# Patient Record
Sex: MALE | Race: WHITE | NOT HISPANIC OR LATINO | URBAN - METROPOLITAN AREA
[De-identification: names, ages, dates, MRNs, and addresses within clinical notes are randomized per-mention and may not be internally consistent; named-entity substitution may affect disease eponyms.]

---

## 2017-04-26 ENCOUNTER — ALLSCRIPTS OFFICE VISIT (OUTPATIENT)
Dept: OTHER | Facility: OTHER | Age: 37
End: 2017-04-26

## 2017-04-26 LAB
CLARITY UR: NORMAL
COLOR UR: YELLOW
GLUCOSE (HISTORICAL): NORMAL
HGB UR QL STRIP.AUTO: NORMAL
KETONES UR STRIP-MCNC: NORMAL MG/DL
LEUKOCYTE ESTERASE UR QL STRIP: NORMAL
NITRITE UR QL STRIP: NORMAL
PH UR STRIP.AUTO: 5 [PH]
PROT UR STRIP-MCNC: NORMAL MG/DL
SP GR UR STRIP.AUTO: 1.01

## 2017-05-11 ENCOUNTER — ALLSCRIPTS OFFICE VISIT (OUTPATIENT)
Dept: OTHER | Facility: OTHER | Age: 37
End: 2017-05-11

## 2017-05-11 ENCOUNTER — LAB REQUISITION (OUTPATIENT)
Dept: LAB | Facility: HOSPITAL | Age: 37
End: 2017-05-11
Payer: COMMERCIAL

## 2017-05-11 DIAGNOSIS — Z30.2 ENCOUNTER FOR STERILIZATION: ICD-10-CM

## 2017-05-11 PROCEDURE — 88302 TISSUE EXAM BY PATHOLOGIST: CPT | Performed by: UROLOGY

## 2017-05-25 ENCOUNTER — ALLSCRIPTS OFFICE VISIT (OUTPATIENT)
Dept: OTHER | Facility: OTHER | Age: 37
End: 2017-05-25

## 2017-06-22 DIAGNOSIS — Z30.2 ENCOUNTER FOR STERILIZATION: ICD-10-CM

## 2018-01-13 VITALS
BODY MASS INDEX: 27.9 KG/M2 | HEART RATE: 66 BPM | SYSTOLIC BLOOD PRESSURE: 144 MMHG | HEIGHT: 72 IN | DIASTOLIC BLOOD PRESSURE: 80 MMHG | WEIGHT: 206 LBS

## 2018-01-14 VITALS
HEIGHT: 72 IN | DIASTOLIC BLOOD PRESSURE: 78 MMHG | BODY MASS INDEX: 27.36 KG/M2 | SYSTOLIC BLOOD PRESSURE: 120 MMHG | HEART RATE: 56 BPM | WEIGHT: 202 LBS

## 2018-01-14 VITALS
WEIGHT: 209 LBS | BODY MASS INDEX: 28.31 KG/M2 | HEART RATE: 66 BPM | DIASTOLIC BLOOD PRESSURE: 80 MMHG | SYSTOLIC BLOOD PRESSURE: 140 MMHG | HEIGHT: 72 IN

## 2023-04-29 ENCOUNTER — APPOINTMENT (OUTPATIENT)
Dept: LAB | Facility: CLINIC | Age: 43
End: 2023-04-29

## 2023-04-29 DIAGNOSIS — J45.20 MILD INTERMITTENT ASTHMA WITHOUT COMPLICATION: ICD-10-CM

## 2023-04-29 DIAGNOSIS — Z11.59 NEED FOR HEPATITIS C SCREENING TEST: ICD-10-CM

## 2023-04-29 DIAGNOSIS — Z87.448 HISTORY OF BLOOD IN URINE: ICD-10-CM

## 2023-04-29 DIAGNOSIS — Z11.4 ENCOUNTER FOR SCREENING FOR HIV: ICD-10-CM

## 2023-04-29 DIAGNOSIS — Z87.448 HISTORY OF BLOOD IN URINE: Primary | ICD-10-CM

## 2023-04-29 DIAGNOSIS — E55.9 VITAMIN D INSUFFICIENCY: ICD-10-CM

## 2023-04-29 DIAGNOSIS — R73.01 ELEVATED FASTING GLUCOSE: ICD-10-CM

## 2023-04-29 LAB
25(OH)D3 SERPL-MCNC: 26.5 NG/ML (ref 30–100)
ALBUMIN SERPL BCP-MCNC: 4.1 G/DL (ref 3.5–5)
ALP SERPL-CCNC: 70 U/L (ref 46–116)
ALT SERPL W P-5'-P-CCNC: 35 U/L (ref 12–78)
ANION GAP SERPL CALCULATED.3IONS-SCNC: 0 MMOL/L (ref 4–13)
AST SERPL W P-5'-P-CCNC: 26 U/L (ref 5–45)
BACTERIA UR QL AUTO: ABNORMAL /HPF
BASOPHILS # BLD AUTO: 0.03 THOUSANDS/ΜL (ref 0–0.1)
BASOPHILS NFR BLD AUTO: 1 % (ref 0–1)
BILIRUB SERPL-MCNC: 0.45 MG/DL (ref 0.2–1)
BILIRUB UR QL STRIP: NEGATIVE
BUN SERPL-MCNC: 18 MG/DL (ref 5–25)
CALCIUM SERPL-MCNC: 8.9 MG/DL (ref 8.3–10.1)
CHLORIDE SERPL-SCNC: 107 MMOL/L (ref 96–108)
CHOLEST SERPL-MCNC: 165 MG/DL
CLARITY UR: CLEAR
CO2 SERPL-SCNC: 29 MMOL/L (ref 21–32)
COLOR UR: YELLOW
CREAT SERPL-MCNC: 1.06 MG/DL (ref 0.6–1.3)
EOSINOPHIL # BLD AUTO: 0.17 THOUSAND/ΜL (ref 0–0.61)
EOSINOPHIL NFR BLD AUTO: 4 % (ref 0–6)
ERYTHROCYTE [DISTWIDTH] IN BLOOD BY AUTOMATED COUNT: 12.7 % (ref 11.6–15.1)
GFR SERPL CREATININE-BSD FRML MDRD: 84 ML/MIN/1.73SQ M
GLUCOSE P FAST SERPL-MCNC: 91 MG/DL (ref 65–99)
GLUCOSE UR STRIP-MCNC: NEGATIVE MG/DL
HCT VFR BLD AUTO: 44.9 % (ref 36.5–49.3)
HDLC SERPL-MCNC: 56 MG/DL
HGB BLD-MCNC: 15.5 G/DL (ref 12–17)
HGB UR QL STRIP.AUTO: NEGATIVE
IMM GRANULOCYTES # BLD AUTO: 0.01 THOUSAND/UL (ref 0–0.2)
IMM GRANULOCYTES NFR BLD AUTO: 0 % (ref 0–2)
KETONES UR STRIP-MCNC: NEGATIVE MG/DL
LDLC SERPL CALC-MCNC: 98 MG/DL (ref 0–100)
LEUKOCYTE ESTERASE UR QL STRIP: NEGATIVE
LYMPHOCYTES # BLD AUTO: 1.97 THOUSANDS/ΜL (ref 0.6–4.47)
LYMPHOCYTES NFR BLD AUTO: 42 % (ref 14–44)
MCH RBC QN AUTO: 31.1 PG (ref 26.8–34.3)
MCHC RBC AUTO-ENTMCNC: 34.5 G/DL (ref 31.4–37.4)
MCV RBC AUTO: 90 FL (ref 82–98)
MONOCYTES # BLD AUTO: 0.55 THOUSAND/ΜL (ref 0.17–1.22)
MONOCYTES NFR BLD AUTO: 12 % (ref 4–12)
MUCOUS THREADS UR QL AUTO: ABNORMAL
NEUTROPHILS # BLD AUTO: 1.88 THOUSANDS/ΜL (ref 1.85–7.62)
NEUTS SEG NFR BLD AUTO: 41 % (ref 43–75)
NITRITE UR QL STRIP: NEGATIVE
NON-SQ EPI CELLS URNS QL MICRO: ABNORMAL /HPF
NONHDLC SERPL-MCNC: 109 MG/DL
NRBC BLD AUTO-RTO: 0 /100 WBCS
PH UR STRIP.AUTO: 6.5 [PH]
PLATELET # BLD AUTO: 229 THOUSANDS/UL (ref 149–390)
PMV BLD AUTO: 9.4 FL (ref 8.9–12.7)
POTASSIUM SERPL-SCNC: 4.6 MMOL/L (ref 3.5–5.3)
PROT SERPL-MCNC: 7.4 G/DL (ref 6.4–8.4)
PROT UR STRIP-MCNC: ABNORMAL MG/DL
RBC # BLD AUTO: 4.99 MILLION/UL (ref 3.88–5.62)
RBC #/AREA URNS AUTO: ABNORMAL /HPF
SODIUM SERPL-SCNC: 136 MMOL/L (ref 135–147)
SP GR UR STRIP.AUTO: 1.02 (ref 1–1.03)
TRIGL SERPL-MCNC: 55 MG/DL
TSH SERPL DL<=0.05 MIU/L-ACNC: 0.59 UIU/ML (ref 0.45–4.5)
UROBILINOGEN UR STRIP-ACNC: <2 MG/DL
WBC # BLD AUTO: 4.61 THOUSAND/UL (ref 4.31–10.16)
WBC #/AREA URNS AUTO: ABNORMAL /HPF

## 2023-04-30 LAB
HCV AB SER QL: NORMAL
HIV 1+2 AB+HIV1 P24 AG SERPL QL IA: NORMAL
HIV 2 AB SERPL QL IA: NORMAL
HIV1 AB SERPL QL IA: NORMAL
HIV1 P24 AG SERPL QL IA: NORMAL

## 2023-06-12 ENCOUNTER — OFFICE VISIT (OUTPATIENT)
Dept: FAMILY MEDICINE CLINIC | Facility: CLINIC | Age: 43
End: 2023-06-12
Payer: COMMERCIAL

## 2023-06-12 ENCOUNTER — TELEPHONE (OUTPATIENT)
Dept: FAMILY MEDICINE CLINIC | Facility: CLINIC | Age: 43
End: 2023-06-12

## 2023-06-12 VITALS
TEMPERATURE: 97.9 F | DIASTOLIC BLOOD PRESSURE: 84 MMHG | OXYGEN SATURATION: 98 % | SYSTOLIC BLOOD PRESSURE: 106 MMHG | BODY MASS INDEX: 27.63 KG/M2 | HEART RATE: 52 BPM | HEIGHT: 72 IN | WEIGHT: 204 LBS

## 2023-06-12 DIAGNOSIS — E55.9 VITAMIN D INSUFFICIENCY: ICD-10-CM

## 2023-06-12 DIAGNOSIS — J30.2 SEASONAL ALLERGIES: Primary | ICD-10-CM

## 2023-06-12 DIAGNOSIS — R73.01 ELEVATED FASTING GLUCOSE: ICD-10-CM

## 2023-06-12 DIAGNOSIS — R80.8 OTHER PROTEINURIA: Primary | ICD-10-CM

## 2023-06-12 PROCEDURE — 99214 OFFICE O/P EST MOD 30 MIN: CPT | Performed by: NURSE PRACTITIONER

## 2023-06-12 RX ORDER — OLOPATADINE HYDROCHLORIDE 1 MG/ML
1 SOLUTION/ DROPS OPHTHALMIC 2 TIMES DAILY
Qty: 5 ML | Refills: 1 | Status: SHIPPED | OUTPATIENT
Start: 2023-06-12 | End: 2023-06-12

## 2023-06-12 NOTE — TELEPHONE ENCOUNTER
[facility] called, the eye gel that you ordered is out of stock they have all the regular eye drops or Arithromycin gel

## 2023-06-12 NOTE — PROGRESS NOTES
BMI Counseling: Body mass index is 27 67 kg/m²  The BMI is above normal  Nutrition recommendations include decreasing portion sizes, encouraging healthy choices of fruits and vegetables, decreasing fast food intake, consuming healthier snacks, limiting drinks that contain sugar, moderation in carbohydrate intake, increasing intake of lean protein, reducing intake of saturated and trans fat and reducing intake of cholesterol  Exercise recommendations include vigorous physical activity 75 minutes/week, exercising 3-5 times per week and strength training exercises  No pharmacotherapy was ordered  Rationale for BMI follow-up plan is due to patient being overweight or obese  Depression Screening and Follow-up Plan: Clincally patient does not have depression  No treatment is required  Assessment/Plan:   Pt is a 39 yr old    Presents in office to review labs and follow up on recent complaints   Asthma has been stable   Has a skin tag that needs removing and does need to see Dermatology for multiple skin spots on his back   Does have scars from history of acne   History of microscopic blood and protein  in urine - continues to have protein in urine   I have ordered US of kidney and bladder   States numbness to right hand 4th and 5th digit - was seen by [facility] - showed carpal tunnel but no interventions - will continues to follow up   Lab reviewed   Vitamin D Supplement --> OTC doing well   Follow up in 1 year for annual physical and I will call with US results when I get results        Problem List Items Addressed This Visit    None  Visit Diagnoses     Other proteinuria    -  Primary    Relevant Orders    US kidney and bladder    Comprehensive metabolic panel    CBC and differential    TSH, 3rd generation with Free T4 reflex    Lipid panel    Vitamin D 25 hydroxy    UA w Reflex to Microscopic w Reflex to Culture -Lab Collect    Vitamin D insufficiency        Relevant Orders    Comprehensive metabolic panel    CBC and differential    TSH, 3rd generation with Free T4 reflex    Lipid panel    Vitamin D 25 hydroxy    UA w Reflex to Microscopic w Reflex to Culture -Lab Collect    Elevated fasting glucose        Relevant Orders    Comprehensive metabolic panel    CBC and differential    TSH, 3rd generation with Free T4 reflex    Lipid panel    Vitamin D 25 hydroxy    UA w Reflex to Microscopic w Reflex to Culture -Lab Collect            Subjective:      Patient ID: Lona Charlton is a 39 y o  male  Pt is a 39 yr old    Presents in office to review labs and follow up on recent complaints   Asthma has been stable   Has a skin tag that needs removing and does need to see Dermatology for multiple skin spots on his back  Does have scars from history of acne   History of microscopic blood and protein  in urine - continues to have protein in urine   I have ordered US of kidney and bladder   States numbness to right hand 4th and 5th digit - was seen by [facility] - showed carpal tunnel but no interventions - will continues to follow up   Lab reviewed   Vitamin D Supplement --> OTC doing well   Follow up in 1 year for annual physical and I will call with US results when I get results       The following portions of the patient's history were reviewed and updated as appropriate:   Past Medical History:  He has no past medical history on file ,  _______________________________________________________________________  Medical Problems:  does not have any pertinent problems on file ,  _______________________________________________________________________  Past Surgical History:   has no past surgical history on file ,  _______________________________________________________________________  Family History:  family history is not on file ,  _______________________________________________________________________  Social History:   reports that he has never smoked   He has never used smokeless tobacco  He reports that he does not drink alcohol and does not use drugs  ,  _______________________________________________________________________  Allergies:  has No Known Allergies     _______________________________________________________________________  No current outpatient medications on file  No current facility-administered medications for this visit      _______________________________________________________________________  Review of Systems   Constitutional: Negative for chills, fatigue and unexpected weight change  HENT: Negative for congestion, dental problem, sinus pressure, sore throat and voice change  Eyes: Negative  Respiratory: Negative for cough and shortness of breath  Cardiovascular: Negative for chest pain and palpitations  Gastrointestinal: Negative for abdominal distention, abdominal pain, nausea and vomiting  Endocrine: Negative  History of elevated fasting glucose    Genitourinary: Negative for difficulty urinating and flank pain  History of microscopic blood in urine   Protein in urine will discuss follow up    Musculoskeletal: Negative for arthralgias and joint swelling  Skin: Negative for rash  Allergic/Immunologic: Negative for environmental allergies  Neurological: Negative for headaches  Hematological: Negative for adenopathy  Psychiatric/Behavioral: Negative for sleep disturbance and suicidal ideas  The patient is not nervous/anxious  Objective:  Vitals:    06/12/23 0709   BP: 106/84   BP Location: Right arm   Patient Position: Sitting   Pulse: (!) 52   Temp: 97 9 °F (36 6 °C)   SpO2: 98%   Weight: 92 5 kg (204 lb)   Height: 6' (1 829 m)     Body mass index is 27 67 kg/m²  Physical Exam  Vitals and nursing note reviewed  Constitutional:       Appearance: Normal appearance  Comments: BMI 27 67   HENT:      Head: Atraumatic  Right Ear: Tympanic membrane normal       Left Ear: Tympanic membrane normal       Nose: No congestion or rhinorrhea        Mouth/Throat: Pharynx: No oropharyngeal exudate or posterior oropharyngeal erythema  Eyes:      Extraocular Movements: Extraocular movements intact  Cardiovascular:      Rate and Rhythm: Normal rate  Pulses: Normal pulses  Heart sounds: Normal heart sounds  Pulmonary:      Effort: Pulmonary effort is normal       Breath sounds: Normal breath sounds  Abdominal:      Palpations: Abdomen is soft  Musculoskeletal:         General: Normal range of motion  Cervical back: Normal range of motion and neck supple  Skin:     General: Skin is warm  Comments: Skin tag to right flank area needs to be removed    Neurological:      Mental Status: He is alert and oriented to person, place, and time  Psychiatric:         Mood and Affect: Mood normal          Behavior: Behavior normal         Contains abnormal data CBC and differential  Order: [id number]   Status: Final result      Visible to patient: Yes (seen)      Next appt: 06/14/2024 at 07:00 AM in Family Medicine [facility]su, [address] [place] [address])      Dx: Elevated fasting glucose; History of          4 Result Notes     3 Patient Communications       Component Ref Range & Units 4/29/23  7:37 AM   WBC 4 31 - 10 16 Thousand/uL 4 61    RBC 3 88 - 5 62 Million/uL 4 99    Hemoglobin 12 0 - 17 0 g/dL 15 5    Hematocrit 36 5 - 49 3 % 44 9    MCV 82 - 98 fL 90    MCH 26 8 - 34 3 pg 31 1    MCHC 31 4 - 37 4 g/dL 34 5    RDW 11 6 - 15 1 % 12 7    MPV 8 9 - 12 7 fL 9 4    Platelets 488 - 061 Thousands/uL 229    nRBC /100 WBCs 0    Neutrophils Relative 43 - 75 % 41 Low     Immat GRANS % 0 - 2 % 0    Lymphocytes Relative 14 - 44 % 42    Monocytes Relative 4 - 12 % 12    Eosinophils Relative 0 - 6 % 4    Basophils Relative 0 - 1 % 1    Neutrophils Absolute 1 85 - 7 62 Thousands/µL 1 88    Immature Grans Absolute 0 00 - 0 20 Thousand/uL 0 01    Lymphocytes Absolute 0 60 - 4 47 Thousands/µL 1 97    Monocytes Absolute 0 17 - 1 22 Thousand/µL 0 55    Eosinophils Absolute 0 00 - 0 61 Thousand/µL 0 17    Basophils Absolute 0 00 - 0 10 Thousands/µL 0 03               Specimen Collected: 04/29/23  7:37 AM Last Resulted: 04/29/23  3:46 PM         TSH, 3rd generation with Free T4 reflex  Order: [id number]   Status: Final result   Visible to patient: Yes (seen)   Next appt: 06/14/2024 at 07:00 AM in [facility])   Dx: Elevated fasting glucose; History of       4 Result Notes   3 Patient Communications       Component Ref Range & Units 4/29/23 7:37 AM   TSH 3RD GENERATON 0 450 - 4 500 uIU/mL 0 587    Comment: Adult TSH (3rd generation) reference range follows the recommended guidelines of the American [employer] Association, January, 2020  Contains abnormal data Comprehensive metabolic panel  Order: [id number]   Status: Final result   Visible to patient: Yes (seen)   Next appt: 06/14/2024 at 07:00 AM in [facility])   Dx: Elevated fasting glucose; History of       4 Result Notes   3 Patient Communications       Component Ref Range & Units 4/29/23 7:37 AM   Sodium 135 - 147 mmol/L 136    Potassium 3 5 - 5 3 mmol/L 4 6    Chloride 96 - 108 mmol/L 107    CO2 21 - 32 mmol/L 29    ANION GAP 4 - 13 mmol/L 0 Low    BUN 5 - 25 mg/dL 18    Creatinine 0 60 - 1 30 mg/dL 1 06    Comment: Standardized to IDMS reference method   Glucose, Fasting 65 - 99 mg/dL 91    Comment: Specimen collection should occur prior to Sulfasalazine administration due to the potential for falsely depressed results  Specimen collection should occur prior to Sulfapyridine administration due to the potential for falsely elevated results  Calcium 8 3 - 10 1 mg/dL 8 9    AST 5 - 45 U/L 26    Comment: Specimen collection should occur prior to Sulfasalazine administration due to the potential for falsely depressed results      ALT 12 - 78 U/L 35    Comment: Specimen collection should occur prior to Sulfasalazine and/or Sulfapyridine administration due to the potential for falsely depressed results  Alkaline Phosphatase 46 - 116 U/L 70    Total Protein 6 4 - 8 4 g/dL 7 4    Albumin 3 5 - 5 0 g/dL 4 1    Total Bilirubin 0 20 - 1 00 mg/dL 0 45    Comment: Use of this assay is not recommended for patients undergoing treatment with eltrombopag due to the potential for falsely elevated results  eGFR ml/min/1 73sq m 84         Contains abnormal data Vitamin D 25 hydroxy  Order: [id number]   Status: Final result   Visible to patient: Yes (seen)   Next appt: 06/14/2024 at 07:00 AM in [facility])   Dx: Elevated fasting glucose; History of       4 Result Notes   3 Patient Communications       Component Ref Range & Units 4/29/23 7:37 AM   Vit D, 25-Hydroxy 30 0 - 100 0 ng/mL 26 5 Low    Lipid panel  Order: [id number]   Status: Final result   Visible to patient: Yes (seen)   Next appt: 06/14/2024 at 07:00 AM in [facility])   Dx: Elevated fasting glucose; History of       4 Result Notes   3 Patient Communications       Component Ref Range & Units 4/29/23 7:37 AM   Cholesterol See Comment mg/dL 165    Comment: Cholesterol:     Pediatric <18 Years     Desirable <170 mg/dL   Borderline High 170-199 mg/dL   High >=200 mg/dL     Adult >=18 Years     Desirable <200 mg/dL   Borderline High 200-239 mg/dL   High >239 mg/dL      Triglycerides See Comment mg/dL 55    Comment: Triglyceride:   0-9Y <75mg/dL   10Y-17Y <90 mg/dL     >=18Y   Normal <150 mg/dL   Borderline High 150-199 mg/dL   High 200-499 mg/dL   Very High >499 mg/dL     Specimen collection should occur prior to N-Acetylcysteine or Metamizole administration due to the potential for falsely depressed results  HDL, Direct >=40 mg/dL 56    Comment: Specimen collection should occur prior to Metamizole administration due to the potential for falsley depressed results     LDL Calculated 0 - 100 mg/dL 98    Comment: LDL Cholesterol:   Optimal <100 mg/dl   Near Optimal 100-129 mg/dl   Above Optimal Borderline High 130-159 mg/dl   High 160-189 mg/dl   Very High >189 mg/dl        Hepatitis C antibody  Order: [id number]   Status: Final result   Visible to patient: Yes (seen)   Next appt: 06/14/2024 at 07:00 AM in [facility])   Dx: Need for hepatitis C screening test   2 Result Notes   2 Patient Communications   1  Topic       Component Ref Range & Units 4/29/23 7:37 AM   Hepatitis C Ab Non-Reactive Non-reactive         : HIV 1/2 AB/AG w Reflex [facility] for 2 yr old and above  Order: [id number]   Status: Final result   Visible to patient: Yes (seen)   Next appt: 06/14/2024 at 07:00 AM in [place] [address])   Dx: Encounter for screening for HIV   1 Result Note   1 Patient Communication   1  Topic       Component Ref Range & Units 4/29/23 7:37 AM   HIV-1 p24 Antigen Non-Reactive Non-Reactive    HIV-1 Antibody Non-Reactive Non-Reactive    HIV-2 Antibody Non-Reactive Non-Reactive    HIV Ag-Ab 5th Gen Non-Reactive Non-Reactive    Comment: A Non-Reactive test result does not preclude the possibility of exposure or infection with HIV-1 and/or HIV-2  Non-Reactive results can occur if the quantity of marker present is below the detection limits or is not present during the stage of disease in which a sample is collected  Repeat testing should be considered where there is clinical suspicion of infection  Final Diagnosis  A  Urine, Clean Catch, :  Negative for high grade urothelial carcinoma ([address]) - see comment  Benign urothelial cells  Benign squamous cells  Neutrophils, lymphocytes and red blood cells  Contains abnormal data UA w Reflex to Microscopic w Reflex to Culture -Lab Collect  Order: [id number]   Status: Final result      Visible to patient: Yes (seen)      Next appt: 06/14/2024 at 07:00 AM in [facility]  [address])      Dx: Elevated fasting glucose; History of         Specimen Information: Urine, Clean Catch    4 Result Notes     3 Patient Communications        Component Ref Range & Units 4/29/23  7:37 AM 4/26/17  9:49 AM   Color, UA  Yellow  Yellow    Clarity, UA  Clear  Transparent    Specific [place], UA 1 003 - 1 030 1 023  1 010 R    pH, UA 4 5, 5 0, 5 5, 6 0, 6 5, 7 0, 7 5, 8 0 6 5  5 R    Leukocytes, UA Negative Negative  - R    Nitrite, UA Negative Negative  - R    Protein, UA Negative mg/dl Trace Abnormal   - R    Glucose, UA Negative mg/dl Negative     Ketones, UA Negative mg/dl Negative  - R    Urobilinogen, UA <2 0 mg/dl mg/dl <2 0     Bilirubin, UA Negative Negative     Occult Blood, UA Negative Negative  - R               Specimen Collected: 04/29/23  7:37 AM Last Resulted: 04/29/23  7:23 PM           Contains abnormal data Urine Microscopic  Order: [id number] - Reflex for Order [id number]   Status: Final result   Visible to patient: Yes (seen)   Next appt: 06/14/2024 at 07:00 AM in Family [facility])   Dx: Elevated fasting glucose; History of       4 Result Notes   3 Patient Communications       Component Ref Range & Units 4/29/23 7:37 AM   RBC, UA None Seen, 1-2 /hpf 1-2    WBC, UA None Seen, 1-2 /hpf 1-2    Epithelial Cells None Seen, Occasional /hpf Occasional    Bacteria, UA None Seen, Occasional /hpf None Seen    MUCUS THREADS None Seen Occasional Abnormal              Specimen Collected: 04/29/23 7:37 AM Last Resulted: 04/29/23 7:32 PM

## 2023-07-06 ENCOUNTER — HOSPITAL ENCOUNTER (OUTPATIENT)
Dept: ULTRASOUND IMAGING | Facility: HOSPITAL | Age: 43
Discharge: HOME | End: 2023-07-06
Payer: COMMERCIAL

## 2023-07-06 DIAGNOSIS — R80.8 OTHER PROTEINURIA: ICD-10-CM

## 2023-07-06 PROCEDURE — 76775 US EXAM ABDO BACK WALL LIM: CPT

## 2024-03-26 ENCOUNTER — APPOINTMENT (OUTPATIENT)
Dept: RADIOLOGY | Facility: CLINIC | Age: 44
End: 2024-03-26
Payer: COMMERCIAL

## 2024-03-26 ENCOUNTER — OFFICE VISIT (OUTPATIENT)
Dept: FAMILY MEDICINE CLINIC | Facility: CLINIC | Age: 44
End: 2024-03-26
Payer: COMMERCIAL

## 2024-03-26 ENCOUNTER — LAB (OUTPATIENT)
Dept: LAB | Facility: CLINIC | Age: 44
End: 2024-03-26
Payer: COMMERCIAL

## 2024-03-26 VITALS
SYSTOLIC BLOOD PRESSURE: 108 MMHG | TEMPERATURE: 98 F | WEIGHT: 202 LBS | BODY MASS INDEX: 27.36 KG/M2 | HEIGHT: 72 IN | HEART RATE: 96 BPM | OXYGEN SATURATION: 97 % | DIASTOLIC BLOOD PRESSURE: 70 MMHG

## 2024-03-26 DIAGNOSIS — R80.8 OTHER PROTEINURIA: ICD-10-CM

## 2024-03-26 DIAGNOSIS — E55.9 VITAMIN D INSUFFICIENCY: ICD-10-CM

## 2024-03-26 DIAGNOSIS — Z12.11 SCREENING FOR COLON CANCER: ICD-10-CM

## 2024-03-26 DIAGNOSIS — R73.01 ELEVATED FASTING GLUCOSE: ICD-10-CM

## 2024-03-26 DIAGNOSIS — M25.561 ACUTE PAIN OF RIGHT KNEE: Primary | ICD-10-CM

## 2024-03-26 DIAGNOSIS — M25.561 ACUTE PAIN OF RIGHT KNEE: ICD-10-CM

## 2024-03-26 LAB
25(OH)D3 SERPL-MCNC: 30.5 NG/ML (ref 30–100)
ALBUMIN SERPL BCP-MCNC: 4.6 G/DL (ref 3.5–5)
ALP SERPL-CCNC: 61 U/L (ref 34–104)
ALT SERPL W P-5'-P-CCNC: 26 U/L (ref 7–52)
ANION GAP SERPL CALCULATED.3IONS-SCNC: 9 MMOL/L (ref 4–13)
AST SERPL W P-5'-P-CCNC: 27 U/L (ref 13–39)
BACTERIA UR QL AUTO: ABNORMAL /HPF
BASOPHILS # BLD AUTO: 0.03 THOUSANDS/ÂΜL (ref 0–0.1)
BASOPHILS NFR BLD AUTO: 1 % (ref 0–1)
BILIRUB SERPL-MCNC: 1.16 MG/DL (ref 0.2–1)
BILIRUB UR QL STRIP: NEGATIVE
BUN SERPL-MCNC: 13 MG/DL (ref 5–25)
CALCIUM SERPL-MCNC: 9.6 MG/DL (ref 8.4–10.2)
CHLORIDE SERPL-SCNC: 101 MMOL/L (ref 96–108)
CHOLEST SERPL-MCNC: 164 MG/DL
CLARITY UR: CLEAR
CO2 SERPL-SCNC: 29 MMOL/L (ref 21–32)
COLOR UR: YELLOW
CREAT SERPL-MCNC: 0.95 MG/DL (ref 0.6–1.3)
EOSINOPHIL # BLD AUTO: 0.19 THOUSAND/ÂΜL (ref 0–0.61)
EOSINOPHIL NFR BLD AUTO: 3 % (ref 0–6)
ERYTHROCYTE [DISTWIDTH] IN BLOOD BY AUTOMATED COUNT: 12.6 % (ref 11.6–15.1)
ERYTHROCYTE [SEDIMENTATION RATE] IN BLOOD: 7 MM/HOUR (ref 0–14)
GFR SERPL CREATININE-BSD FRML MDRD: 96 ML/MIN/1.73SQ M
GLUCOSE P FAST SERPL-MCNC: 83 MG/DL (ref 65–99)
GLUCOSE UR STRIP-MCNC: NEGATIVE MG/DL
HCT VFR BLD AUTO: 44.4 % (ref 36.5–49.3)
HDLC SERPL-MCNC: 52 MG/DL
HGB BLD-MCNC: 15.3 G/DL (ref 12–17)
HGB UR QL STRIP.AUTO: NEGATIVE
IMM GRANULOCYTES # BLD AUTO: 0.01 THOUSAND/UL (ref 0–0.2)
IMM GRANULOCYTES NFR BLD AUTO: 0 % (ref 0–2)
KETONES UR STRIP-MCNC: NEGATIVE MG/DL
LDLC SERPL CALC-MCNC: 92 MG/DL (ref 0–100)
LEUKOCYTE ESTERASE UR QL STRIP: NEGATIVE
LYMPHOCYTES # BLD AUTO: 2.49 THOUSANDS/ÂΜL (ref 0.6–4.47)
LYMPHOCYTES NFR BLD AUTO: 44 % (ref 14–44)
MCH RBC QN AUTO: 30.6 PG (ref 26.8–34.3)
MCHC RBC AUTO-ENTMCNC: 34.5 G/DL (ref 31.4–37.4)
MCV RBC AUTO: 89 FL (ref 82–98)
MONOCYTES # BLD AUTO: 0.52 THOUSAND/ÂΜL (ref 0.17–1.22)
MONOCYTES NFR BLD AUTO: 9 % (ref 4–12)
MUCOUS THREADS UR QL AUTO: ABNORMAL
NEUTROPHILS # BLD AUTO: 2.42 THOUSANDS/ÂΜL (ref 1.85–7.62)
NEUTS SEG NFR BLD AUTO: 43 % (ref 43–75)
NITRITE UR QL STRIP: NEGATIVE
NON-SQ EPI CELLS URNS QL MICRO: ABNORMAL /HPF
NONHDLC SERPL-MCNC: 112 MG/DL
NRBC BLD AUTO-RTO: 0 /100 WBCS
PH UR STRIP.AUTO: 6.5 [PH]
PLATELET # BLD AUTO: 234 THOUSANDS/UL (ref 149–390)
PMV BLD AUTO: 9.5 FL (ref 8.9–12.7)
POTASSIUM SERPL-SCNC: 3.9 MMOL/L (ref 3.5–5.3)
PROT SERPL-MCNC: 7.2 G/DL (ref 6.4–8.4)
PROT UR STRIP-MCNC: ABNORMAL MG/DL
RBC # BLD AUTO: 5 MILLION/UL (ref 3.88–5.62)
RBC #/AREA URNS AUTO: ABNORMAL /HPF
SODIUM SERPL-SCNC: 139 MMOL/L (ref 135–147)
SP GR UR STRIP.AUTO: 1.02 (ref 1–1.03)
TRIGL SERPL-MCNC: 99 MG/DL
TSH SERPL DL<=0.05 MIU/L-ACNC: 1.4 UIU/ML (ref 0.45–4.5)
UROBILINOGEN UR STRIP-ACNC: <2 MG/DL
WBC # BLD AUTO: 5.66 THOUSAND/UL (ref 4.31–10.16)
WBC #/AREA URNS AUTO: ABNORMAL /HPF

## 2024-03-26 PROCEDURE — 85025 COMPLETE CBC W/AUTO DIFF WBC: CPT

## 2024-03-26 PROCEDURE — 84443 ASSAY THYROID STIM HORMONE: CPT

## 2024-03-26 PROCEDURE — 80053 COMPREHEN METABOLIC PANEL: CPT

## 2024-03-26 PROCEDURE — 80061 LIPID PANEL: CPT

## 2024-03-26 PROCEDURE — 73564 X-RAY EXAM KNEE 4 OR MORE: CPT

## 2024-03-26 PROCEDURE — 36415 COLL VENOUS BLD VENIPUNCTURE: CPT

## 2024-03-26 PROCEDURE — 99213 OFFICE O/P EST LOW 20 MIN: CPT | Performed by: NURSE PRACTITIONER

## 2024-03-26 PROCEDURE — 81001 URINALYSIS AUTO W/SCOPE: CPT

## 2024-03-26 PROCEDURE — 85652 RBC SED RATE AUTOMATED: CPT | Performed by: NURSE PRACTITIONER

## 2024-03-26 PROCEDURE — 82306 VITAMIN D 25 HYDROXY: CPT

## 2024-03-26 NOTE — RESULT ENCOUNTER NOTE
Ortho referral placed to further discuss   There is mild osteoarthritis of the right knee, with mild medial joint compartment narrowing and marginal spurring, and spurring associated with the patellofemoral joint. There is bony deformity and spurring associated with the proximal tibiofibular   joint, possibly related to remote trauma

## 2024-03-26 NOTE — PROGRESS NOTES
Assessment/Plan:    Pt is a 44 yr old male   Presents in office for right knee pain now for 2 weeks after working on the floor - knee swelling . Did not use knee protector. Has pain with ambulation and does appear swollen   No ROM issues . I have ordered an XR to be done STAT   Will call with results   And discusses follow up with ORTHO. Discussed possibility of knee effusion due to pressure   ICE ELEVATE REST and use immobilizer to knee to prevent further swelling   Can take NSAIDs - with food as discussed   NEGATIVE FOR CALF PAIN   NO ANKLE OR CALF EDEMA      Problem List Items Addressed This Visit    None  Visit Diagnoses       Acute pain of right knee    -  Primary    Relevant Orders    XR knee 4+ vw right injury    Sedimentation rate, automated    Screening for colon cancer        Relevant Orders    Cologuard              Subjective:      Patient ID: Maciej Feliz is a 44 y.o. male.    Pt is a 44 yr old male   Presents in office for right knee pain now for 2 weeks after working on the floor - knee swelling . Did not use knee protector. Has pain with ambulation and does appear swollen   No ROM issues .         The following portions of the patient's history were reviewed and updated as appropriate:   Past Medical History:  He has no past medical history on file.,  _______________________________________________________________________  Medical Problems:  does not have any pertinent problems on file.,  _______________________________________________________________________  Past Surgical History:   has no past surgical history on file.,  _______________________________________________________________________  Family History:  family history is not on file.,  _______________________________________________________________________  Social History:   reports that he has never smoked. He has never used smokeless tobacco. He reports that he does not drink alcohol and does not use  drugs.,  _______________________________________________________________________  Allergies:  has No Known Allergies..  _______________________________________________________________________  No current outpatient medications on file.     No current facility-administered medications for this visit.     _______________________________________________________________________  Review of Systems   Constitutional:  Negative for fatigue, fever and unexpected weight change.   HENT:  Negative for congestion, postnasal drip and sore throat.    Eyes: Negative.    Respiratory:  Negative for cough and shortness of breath.    Cardiovascular:  Negative for chest pain and palpitations.   Gastrointestinal:  Negative for abdominal distention.   Genitourinary:  Negative for difficulty urinating and flank pain.   Musculoskeletal:  Positive for joint swelling (right knee).   Skin:  Negative for rash.   Neurological:  Negative for headaches.   Psychiatric/Behavioral:  Negative for sleep disturbance and suicidal ideas. The patient is not nervous/anxious.          Objective:  Vitals:    03/26/24 0705   BP: 108/70   BP Location: Right arm   Patient Position: Sitting   Cuff Size: Large   Pulse: 96   Temp: 98 °F (36.7 °C)   SpO2: 97%   Weight: 91.6 kg (202 lb)   Height: 6' (1.829 m)     Body mass index is 27.4 kg/m².     Physical Exam  Vitals and nursing note reviewed.   Constitutional:       Appearance: Normal appearance.   HENT:      Head: Normocephalic.      Nose: No congestion or rhinorrhea.   Abdominal:      Palpations: Abdomen is soft.   Musculoskeletal:         General: Swelling and tenderness present.      Right lower leg: No edema.      Left lower leg: No edema.      Comments: Right knee swelling and tenderness to right lateral aspect and swelling bellow knee cap    Neurological:      Mental Status: He is alert and oriented to person, place, and time.   Psychiatric:         Mood and Affect: Mood normal.         Behavior: Behavior  normal.          Statement Selected

## 2024-03-27 DIAGNOSIS — Z00.6 ENCOUNTER FOR EXAMINATION FOR NORMAL COMPARISON OR CONTROL IN CLINICAL RESEARCH PROGRAM: ICD-10-CM

## 2024-04-01 ENCOUNTER — OFFICE VISIT (OUTPATIENT)
Dept: OBGYN CLINIC | Facility: CLINIC | Age: 44
End: 2024-04-01
Payer: COMMERCIAL

## 2024-04-01 VITALS
BODY MASS INDEX: 27.06 KG/M2 | SYSTOLIC BLOOD PRESSURE: 105 MMHG | WEIGHT: 199.8 LBS | DIASTOLIC BLOOD PRESSURE: 66 MMHG | HEIGHT: 72 IN | HEART RATE: 55 BPM

## 2024-04-01 DIAGNOSIS — M25.561 ACUTE PAIN OF RIGHT KNEE: ICD-10-CM

## 2024-04-01 DIAGNOSIS — M22.2X1 PATELLOFEMORAL DISORDER OF RIGHT KNEE: Primary | ICD-10-CM

## 2024-04-01 PROCEDURE — 99203 OFFICE O/P NEW LOW 30 MIN: CPT | Performed by: STUDENT IN AN ORGANIZED HEALTH CARE EDUCATION/TRAINING PROGRAM

## 2024-04-01 NOTE — PROGRESS NOTES
Orthopaedics Office Visit - New Patient Visit    ASSESSMENT/PLAN:    Assessment:   Right knee patellofemoral syndrome    Plan:   X-rays reviewed and discussed with patient revealing mild medial and patellofemoral narrowing. There is no fracture or dislocation  Pt to be weightbearing as tolerated to right lower extremity  ROM as tolerated to right knee  Discussed with patient that if the pain worsens he can come back for a corticosteroid injection and PT script  Pt to begin home exercise program for strengthening of his VMO.  Pt to continue at home analgesic regimen with aleve   Pt to follow up as needed    _____________________________________________________  CHIEF COMPLAINT:  No chief complaint on file.        SUBJECTIVE:  Maciej Feliz is a 44 y.o. male who presents with right knee pain for 2 weeks.  He states he was kneeling while following the floor and he felt a pop in his knee.  He had pain for 2 minutes and then the pain subsided.  Pain was on the medial aspect of his knee.  He states that his knee was swollen for a few days but has subsided since then.  Now he has pain with walking and kneeling.  His pain is mild.  He denies any numbness or tingling to the right lower extremity.  He has not had pain in his knee in the recent past.  He he states that he had a traumatic injury to the knee when he was a teenager but his knee has not been bothering him since the accident.    PAST MEDICAL HISTORY:  No past medical history on file.    PAST SURGICAL HISTORY:  No past surgical history on file.    FAMILY HISTORY:  No family history on file.    SOCIAL HISTORY:  Social History     Tobacco Use    Smoking status: Never    Smokeless tobacco: Never   Vaping Use    Vaping status: Never Used   Substance Use Topics    Alcohol use: Never    Drug use: Never       MEDICATIONS:  No current outpatient medications on file.    ALLERGIES:  No Known Allergies    REVIEW OF SYSTEMS:  MSK: Right knee pain   Neuro: no numbness or  "paresthesias  Pertinent items are otherwise noted in HPI.  A comprehensive review of systems was otherwise negative.    LABS:  HgA1c:   Lab Results   Component Value Date    HGBA1C 5.3 10/18/2019     BMP:   Lab Results   Component Value Date    CALCIUM 9.6 03/26/2024    K 3.9 03/26/2024    CO2 29 03/26/2024     03/26/2024    BUN 13 03/26/2024    CREATININE 0.95 03/26/2024     CBC: No components found for: \"CBC\"    _____________________________________________________  PHYSICAL EXAMINATION:  Vital signs: There were no vitals taken for this visit.  General: No acute distress, awake and alert  Psychiatric: Mood and affect appear appropriate  HEENT: Trachea Midline, No torticollis, no apparent facial trauma  Cardiovascular: No audible murmurs; Extremities appear perfused  Pulmonary: No audible wheezing or stridor  Skin: No open lesions; see further details (if any) below    MUSCULOSKELETAL EXAMINATION:  Extremities:    The right lower extremity was exposed and inspected. Visible skin intact without erythema, ecchymosis, effusion or obvious osseous deformity. TTP medial patellar facet. Pt able to range from 0 degrees of extension to 130 degrees of flexion with crepitation.  Negative patellar grind test, patellar apprehension 1 out of 4, Negative Lachman, negative Ca test. sensation intact to superficial peroneal, deep peroneal, sural, saphenous, plantar nerve distributions. Motor intact to extensor hallux longus, tibialis anterior, gastrocnemius muscles, extensor mechanism intact. Limb is well perfused. Brisk capillary refill in all 5 digits. Compartments soft and compressible.        _____________________________________________________  STUDIES REVIEWED:  I personally reviewed the images and interpretation is as follows:  X-rays of the right knee demonstrate mild medial and patellofemoral compartment narrowing with para-articular osteophyte formation.  The lateral joint line is well-preserved.  There is no " fracture or dislocation noted.  None    PROCEDURES PERFORMED:  Procedures none    Kiel Severino MD

## 2024-04-06 LAB — COLOGUARD RESULT REPORTABLE: NEGATIVE

## 2024-04-24 ENCOUNTER — APPOINTMENT (OUTPATIENT)
Dept: LAB | Facility: AMBULARY SURGERY CENTER | Age: 44
End: 2024-04-24

## 2024-04-24 DIAGNOSIS — Z00.6 ENCOUNTER FOR EXAMINATION FOR NORMAL COMPARISON OR CONTROL IN CLINICAL RESEARCH PROGRAM: ICD-10-CM

## 2024-04-24 PROCEDURE — 36415 COLL VENOUS BLD VENIPUNCTURE: CPT

## 2024-05-12 LAB
APOB+LDLR+PCSK9 GENE MUT ANL BLD/T: NOT DETECTED
BRCA1+BRCA2 DEL+DUP + FULL MUT ANL BLD/T: NOT DETECTED
MLH1+MSH2+MSH6+PMS2 GN DEL+DUP+FUL M: NOT DETECTED

## 2024-06-05 ENCOUNTER — OFFICE VISIT (OUTPATIENT)
Dept: FAMILY MEDICINE CLINIC | Facility: CLINIC | Age: 44
End: 2024-06-05
Payer: COMMERCIAL

## 2024-06-05 VITALS
SYSTOLIC BLOOD PRESSURE: 108 MMHG | WEIGHT: 201 LBS | HEIGHT: 72 IN | BODY MASS INDEX: 27.22 KG/M2 | TEMPERATURE: 97.9 F | DIASTOLIC BLOOD PRESSURE: 74 MMHG | OXYGEN SATURATION: 98 % | HEART RATE: 58 BPM

## 2024-06-05 DIAGNOSIS — E55.9 VITAMIN D DEFICIENCY: ICD-10-CM

## 2024-06-05 DIAGNOSIS — Z00.00 ANNUAL PHYSICAL EXAM: ICD-10-CM

## 2024-06-05 DIAGNOSIS — M25.531 RIGHT WRIST PAIN: ICD-10-CM

## 2024-06-05 DIAGNOSIS — R17 ELEVATED BILIRUBIN: Primary | ICD-10-CM

## 2024-06-05 PROCEDURE — 99396 PREV VISIT EST AGE 40-64: CPT | Performed by: NURSE PRACTITIONER

## 2024-06-05 NOTE — PATIENT INSTRUCTIONS

## 2024-06-05 NOTE — PROGRESS NOTES
Adult Annual Physical  Name: Maciej Feliz      : 1980      MRN: [id number]  Encounter Provider: AZUL Rios  Encounter Date: 2024   Encounter department[facility]    Assessment & Plan   1. Elevated bilirubin  -     Comprehensive metabolic panel; Future  2. Annual physical exam    Immunizations and preventive care screenings were discussed with patient today. Appropriate education was printed on patient's after visit summary.    Discussed risks and benefits of prostate cancer screening. We discussed the controversial history of PSA screening for prostate cancer in the [place] as well as the risk of over detection and over treatment of prostate cancer by way of PSA screening.  The patient understands that PSA blood testing is an imperfect way to screen for prostate cancer and that elevated PSA levels in the blood may also be caused by infection, inflammation, prostatic trauma or manipulation, urological procedures, or by benign prostatic enlargement.    The role of the digital rectal examination in prostate cancer screening was also discussed and I discussed with him that there is large interobserver variability in the findings of digital rectal examination.    Counseling:  Alcohol/drug use: discussed moderation in alcohol intake, the recommendations for healthy alcohol use, and avoidance of illicit drug use.  Dental Health: discussed importance of regular tooth brushing, flossing, and dental visits.  Injury prevention: discussed safety/seat belts, safety helmets, smoke detectors, carbon dioxide detectors, and smoking near bedding or upholstery.  Sexual health: discussed sexually transmitted diseases, partner selection, use of condoms, avoidance of unintended pregnancy, and contraceptive alternatives.  Exercise: the importance of regular exercise/physical activity was discussed. Recommend exercise 3-5 times per week for at least 30 minutes.        Depression Screening and Follow-up Plan: Patient was screened for depression during today's encounter. They screened negative with a PHQ-2 score of 0.        History of Present Illness     Adult Annual Physical:  Patient presents for annual physical. Pt is a 44 yr old male  Presents in office for annual physical   Denies any issues currently .     Diet and Physical Activity:  - Diet/Nutrition: well balanced diet.  - Exercise: moderate cardiovascular exercise.    Depression Screening:  - PHQ-2 Score: 0    General Health:  - Sleep: sleeps poorly and sleeps well.  - Hearing: normal hearing bilateral ears.  - Vision: no vision problems and goes for regular eye exams.  - Dental: regular dental visits.     Health:  - History of STDs: no.   - Urinary symptoms: none.     Advanced Care Planning:  - Has an advanced directive?: no    - Has a durable medical POA?: no    - ACP document given to patient?: no      Review of Systems   Constitutional:  Negative for chills, fatigue, fever and unexpected weight change.   HENT:  Negative for congestion, dental problem, sinus pressure, sore throat and voice change.    Eyes: Negative.    Respiratory:  Negative for cough and shortness of breath.    Cardiovascular:  Negative for chest pain and palpitations.   Gastrointestinal:  Negative for abdominal distention, abdominal pain, nausea and vomiting.   Endocrine: Negative.         History of elevated fasting glucose    Genitourinary:  Negative for difficulty urinating and flank pain.        History of microscopic blood in urine    Musculoskeletal:  Negative for arthralgias and joint swelling.   Skin:  Negative for rash.   Allergic/Immunologic: Negative for environmental allergies.   Neurological:  Negative for headaches.   Hematological:  Negative for adenopathy.   Psychiatric/Behavioral:  Negative for sleep disturbance and suicidal ideas. The patient is not nervous/anxious.      Pertinent Medical History   Reviewed       Medical  History Reviewed by provider this encounter:  Tobacco  Allergies  Meds  Problems  Med Hx  Surg Hx  Fam Hx       Past Medical History   History reviewed. No pertinent past medical history.  History reviewed. No pertinent surgical history.  History reviewed. No pertinent family history.  Current Outpatient Medications on File Prior to Visit   Medication Sig Dispense Refill    Cholecalciferol (Vitamin D3) 125 MCG (5000 UT) TABS Take 5,000 Units by mouth daily       No current facility-administered medications on file prior to visit.   No Known Allergies   Current Outpatient Medications on File Prior to Visit   Medication Sig Dispense Refill    Cholecalciferol (Vitamin D3) 125 MCG (5000 UT) TABS Take 5,000 Units by mouth daily       No current facility-administered medications on file prior to visit.      Social History     Tobacco Use    Smoking status: Never    Smokeless tobacco: Never   Vaping Use    Vaping status: Never Used   Substance and Sexual Activity    Alcohol use: Never    Drug use: Never    Sexual activity: Not on file       Objective     /74 (BP Location: Right arm, Patient Position: Sitting, Cuff Size: Adult)   Pulse 58   Temp 97.9 °F (36.6 °C)   Ht 6' (1.829 m)   Wt 91.2 kg (201 lb)   SpO2 98%   BMI 27.26 kg/m²     Physical Exam  Vitals and nursing note reviewed.   Constitutional:       Appearance: Normal appearance.      Comments: BMI 27.26   HENT:      Head: Atraumatic.   Eyes:      Extraocular Movements: Extraocular movements intact.   Cardiovascular:      Rate and Rhythm: Normal rate and regular rhythm.      Pulses: Normal pulses.      Heart sounds: Normal heart sounds.   Pulmonary:      Effort: Pulmonary effort is normal.      Breath sounds: Normal breath sounds.   Abdominal:      Palpations: Abdomen is soft.   Musculoskeletal:      Cervical back: Normal range of motion.      Right lower leg: No edema.      Left lower leg: No edema.   Skin:     General: Skin is warm.       Capillary Refill: Capillary refill takes less than 2 seconds.   Neurological:      Mental Status: He is alert and oriented to person, place, and time.   Psychiatric:         Mood and Affect: Mood normal.         Behavior: Behavior normal.       Administrative Statements   I have spent a total time of 30  minutes on 06/05/24 In caring for this patient including Diagnostic results, Risks and benefits of tx options, Instructions for management, Patient and family education, Reviewing / ordering tests, medicine, procedures  , and Obtaining or reviewing history  .    Contains abnormal data Comprehensive metabolic panel  Order: [id number]   Status: Final result       Visible to patient: Yes (seen)       Next appt: 06/09/2025 at 07:00 AM in Family Medicine (AZUL Rios)       Dx: Elevated fasting glucose; Vitamin D i...    0 Result Notes        Component  Ref Range & Units 3/26/24  7:42 AM 4/29/23  7:37 AM 10/18/19  7:35 AM   Sodium  135 - 147 mmol/L 139 136 141 R   Potassium  3.5 - 5.3 mmol/L 3.9 4.6 4.3 R   Chloride  96 - 108 mmol/L 101 107 107 R   CO2  21 - 32 mmol/L 29 29 28 R   ANION GAP  4 - 13 mmol/L 9 0 Low  6 R   BUN  5 - 25 mg/dL 13 18 14 R   Creatinine  0.60 - 1.30 mg/dL 0.95 1.06 CM 1.02 R   Comment: Standardized to IDMS reference method   Glucose, Fasting  65 - 99 mg/dL 83 91 CM    Calcium  8.4 - 10.2 mg/dL 9.6 8.9 R 9.1 R   AST  13 - 39 U/L 27 26 R, CM 19 R   ALT  7 - 52 U/L 26 35 R, CM 30 R   Comment: Specimen collection should occur prior to Sulfasalazine administration due to the potential for falsely depressed results.   Alkaline Phosphatase  34 - 104 U/L 61 70 R 78 R   Total Protein  6.4 - 8.4 g/dL 7.2 7.4 7.2 R   Albumin  3.5 - 5.0 g/dL 4.6 4.1 4.2 R   Total Bilirubin  0.20 - 1.00 mg/dL 1.16 High  0.45 CM 0.6 R   Comment: Use of this assay is not recommended for patients undergoing treatment with eltrombopag due to the potential for falsely elevated results.  N-acetyl-p-benzoquinone imine  (metabolite of Acetaminophen) will generate erroneously low results in samples for patients that have taken an overdose of Acetaminophen.   eGFR  ml/min/1.73sq m 96 84 92 R, CM      CBC and differential  Order: [id number]   Status: Final result       Visible to patient: Yes (seen)       Next appt: 06/09/2025 at 07:00 AM in [facility] (AZUL Rios)       Dx: Elevated fasting glucose; Vitamin D i...    0 Result Notes       Component  Ref Range & Units 3/26/24  7:42 AM 4/29/23  7:37 AM   WBC  4.31 - 10.16 Thousand/uL 5.66 4.61   RBC  3.88 - 5.62 Million/uL 5.00 4.99   Hemoglobin  12.0 - 17.0 g/dL 15.3 15.5   Hematocrit  36.5 - 49.3 % 44.4 44.9   MCV  82 - 98 fL 89 90   MCH  26.8 - 34.3 pg 30.6 31.1   MCHC  31.4 - 37.4 g/dL 34.5 34.5   RDW  11.6 - 15.1 % 12.6 12.7   MPV  8.9 - 12.7 fL 9.5 9.4   Platelets  149 - 390 Thousands/uL 234 229   nRBC  /100 WBCs 0 0   Segmented %  43 - 75 % 43 41 Low    Immature Grans %  0 - 2 % 0 0   Lymphocytes %  14 - 44 % 44 42   Monocytes %  4 - 12 % 9 12   Eosinophils Relative  0 - 6 % 3 4   Basophils Relative  0 - 1 % 1 1   Absolute Neutrophils  1.85 - 7.62 Thousands/µL 2.42 1.88   Absolute Immature Grans  0.00 - 0.20 Thousand/uL 0.01 0.01   Absolute Lymphocytes  0.60 - 4.47 Thousands/µL 2.49 1.97   Absolute Monocytes  0.17 - 1.22 Thousand/µL 0.52 0.55   Eosinophils Absolute  0.00 - 0.61 Thousand/µL 0.19 0.17   Basophils Absolute  0.00 - 0.10 Thousands/µL 0.03 0.03              Specimen Collected: 03/26/24  7:42 AM Last Resulted: 03/26/24  4:23 PM             TSH, 3rd generation with Free T4 reflex  Order: [id number]   Status: Final result       Visible to patient: Yes (seen)       Next appt: 06/09/2025 at 07:00 AM in Family Medicine (AZUL Rios)       Dx: Elevated fasting glucose; Vitamin D i...    0 Result Notes       Component  Ref Range & Units 3/26/24  7:42 AM 4/29/23  7:37 AM   TSH 3RD GENERATON  0.450 - 4.500 uIU/mL 1.396 0.587 CM   Comment: Adult TSH (3rd  generation) reference range follows the recommended guidelines of the American Thyroid Association, January, 2020.      Lipid panel  Order: [id number]   Status: Final result       Visible to patient: Yes (seen)       Next appt: 06/09/2025 at 07:00 AM in Family Medicine (AZUL Rios)       Dx: Elevated fasting glucose; Vitamin D i...    0 Result Notes       Component  Ref Range & Units 3/26/24  7:42 AM 4/29/23  7:37 AM   Cholesterol  See Comment mg/dL 164 165 CM   Comment: Cholesterol:        Pediatric <18 Years        Desirable          <170 mg/dL      Borderline High    170-199 mg/dL      High               >=200 mg/dL        Adult >=18 Years           Desirable         <200 mg/dL      Borderline High   200-239 mg/dL      High             >239 mg/dL   Triglycerides  See Comment mg/dL 99 55 CM   Comment: Triglyceride:     0-9Y            <75mg/dL     10Y-17Y         <90 mg/dL       >=18Y     Normal          <150 mg/dL     Borderline High 150-199 mg/dL     High            200-499 mg/dL       Very High       >499 mg/dL    Specimen collection should occur prior to Metamizole administration due to the potential for falsely depressed results.   HDL, Direct  >=40 mg/dL 52 56 CM   LDL Calculated  0 - 100 mg/dL 92 98 CM      Vitamin D 25 hydroxy  Order: [id number]   Status: Final result       Visible to patient: Yes (seen)       Next appt: 06/09/2025 at 07:00 AM in Family Medicine (AZUL Rios)       Dx: Elevated fasting glucose; Vitamin D i...    0 Result Notes       Component  Ref Range & Units 3/26/24  7:42 AM 4/29/23  7:37 AM   Vit D, 25-Hydroxy  30.0 - 100.0 ng/mL 30.5 26.5 Low    Comment: Vitamin D guidelines established by Clinical Guidelines Subcommittee  of the Endocrine Society Task Force, 2011    Deficiency <20ng/ml  Insufficiency 20-30ng/ml  Sufficient  ng/ml              Specimen Collected: 03/26/24  7:42 AM Last Resulted: 03/26/24  4:45 PM         Contains abnormal data UA w Reflex to  Microscopic w Reflex to Culture -Lab Collect  Order: [id number]   Status: Final result       Visible to patient: Yes (seen)       Next appt: 06/09/2025 at 07:00 AM in [facility] (AZUL Rios)       Dx: Elevated fasting glucose; Vitamin D i...    Specimen Information: Urine   1 Result Note       1 Patient Communication        Component  Ref Range & Units 3/26/24  7:42 AM 4/29/23  7:37 AM 4/26/17  9:49 AM   Color, UA Yellow Yellow Yellow   Clarity, UA Clear Clear Transparent   Specific Gravity, UA  1.003 - 1.030 1.018 1.023 1.010 R   pH, UA  4.5, 5.0, 5.5, 6.0, 6.5, 7.0, 7.5, 8.0 6.5 6.5 5 R   Leukocytes, UA  Negative Negative Negative - R   Nitrite, UA  Negative Negative Negative - R   Protein, UA  Negative mg/dl Trace Abnormal  Trace Abnormal  - R   Glucose, UA  Negative mg/dl Negative Negative    Ketones, UA  Negative mg/dl Negative Negative - R   Urobilinogen, UA  <2.0 mg/dl mg/dl <2.0 <2.0    Bilirubin, UA  Negative Negative Negative    Occult Blood, UA  Negative Negative Negative - R              Specimen Collected: 03/26/24  7:42 AM Last Resulted: 03/26/24  3:53 PM              Contains abnormal data UA w Reflex to Microscopic w Reflex to Culture -Lab Collect  Order: [id number]   Status: Final result       Visible to patient: Yes (seen)       Next appt: 06/09/2025 at 07:00 AM in [facility] (AZUL Rios)       Dx: Elevated fasting glucose; Vitamin D i...    Specimen Information: Urine   1 Result Note       1 Patient Communication        Component  Ref Range & Units 3/26/24  7:42 AM 4/29/23  7:37 AM 4/26/17  9:49 AM   Color, UA Yellow Yellow Yellow   Clarity, UA Clear Clear Transparent   Specific Gravity, UA  1.003 - 1.030 1.018 1.023 1.010 R   pH, UA  4.5, 5.0, 5.5, 6.0, 6.5, 7.0, 7.5, 8.0 6.5 6.5 5 R   Leukocytes, UA  Negative Negative Negative - R   Nitrite, UA  Negative Negative Negative - R   Protein, UA  Negative mg/dl Trace Abnormal  Trace Abnormal  - R   Glucose, UA  Negative  mg/dl Negative Negative    Ketones, UA  Negative mg/dl Negative Negative - R   Urobilinogen, UA  <2.0 mg/dl mg/dl <2.0 <2.0    Bilirubin, UA  Negative Negative Negative    Occult Blood, UA  Negative Negative Negative - R              Specimen Collected: 03/26/24  7:42 AM Last Resulted: 03/26/24  3:53 PM

## 2024-06-07 ENCOUNTER — PATIENT MESSAGE (OUTPATIENT)
Dept: FAMILY MEDICINE CLINIC | Facility: CLINIC | Age: 44
End: 2024-06-07

## 2025-05-19 ENCOUNTER — TELEPHONE (OUTPATIENT)
Dept: FAMILY MEDICINE CLINIC | Facility: CLINIC | Age: 45
End: 2025-05-19

## 2025-05-19 DIAGNOSIS — R17 ELEVATED BILIRUBIN: Primary | ICD-10-CM

## 2025-05-19 DIAGNOSIS — E55.9 VITAMIN D DEFICIENCY: ICD-10-CM

## 2025-05-28 LAB
25(OH)D3+25(OH)D2 SERPL-MCNC: 32 NG/ML (ref 30–100)
ALBUMIN SERPL-MCNC: 4.5 G/DL (ref 3.5–5.7)
ALP SERPL-CCNC: 49 U/L (ref 35–120)
ALT SERPL-CCNC: 39 U/L
ANION GAP SERPL CALCULATED.3IONS-SCNC: 7 MMOL/L (ref 3–11)
AST SERPL-CCNC: 20 U/L
BASOPHILS # BLD AUTO: 0 THOU/CMM (ref 0–0.1)
BASOPHILS NFR BLD AUTO: 0 %
BILIRUB SERPL-MCNC: 1 MG/DL (ref 0.2–1)
BUN SERPL-MCNC: 16 MG/DL (ref 7–28)
CALCIUM SERPL-MCNC: 9.4 MG/DL (ref 8.5–10.5)
CHLORIDE SERPL-SCNC: 100 MMOL/L (ref 100–109)
CO2 SERPL-SCNC: 31 MMOL/L (ref 21–31)
CREAT SERPL-MCNC: 1.04 MG/DL (ref 0.53–1.3)
CYTOLOGY CMNT CVX/VAG CYTO-IMP: NORMAL
DIFFERENTIAL METHOD BLD: ABNORMAL
EOSINOPHIL # BLD AUTO: 0.2 THOU/CMM (ref 0–0.5)
EOSINOPHIL NFR BLD AUTO: 2 %
ERYTHROCYTE [DISTWIDTH] IN BLOOD BY AUTOMATED COUNT: 13.1 % (ref 12–16)
GFR/BSA.PRED SERPLBLD CYS-BASED-ARV: 89 ML/MIN/{1.73_M2}
GLUCOSE SERPL-MCNC: 88 MG/DL (ref 65–99)
GLUCOSE UR QL STRIP: NEGATIVE MG/DL
HCT VFR BLD AUTO: 44 % (ref 37–48)
HGB BLD-MCNC: 15 G/DL (ref 12.5–17)
HGB UR QL STRIP: NEGATIVE MG/DL
KETONES UR QL STRIP: NEGATIVE MG/DL
LEUKOCYTE ESTERASE UR QL STRIP: NEGATIVE /UL
LYMPHOCYTES # BLD AUTO: 3 THOU/CMM (ref 1–3)
LYMPHOCYTES NFR BLD AUTO: 32 %
MCH RBC QN AUTO: 30.6 PG (ref 27–36)
MCHC RBC AUTO-ENTMCNC: 34.1 G/DL (ref 32–37)
MCV RBC AUTO: 90 FL (ref 80–100)
MONOCYTES # BLD AUTO: 0.7 THOU/CMM (ref 0.3–1)
MONOCYTES NFR BLD AUTO: 7 %
NEUTROPHILS # BLD AUTO: 5.5 THOU/CMM (ref 1.8–7.8)
NEUTROPHILS NFR BLD AUTO: 59 %
NITRITE UR QL STRIP: NEGATIVE
PH UR: 6 [PH] (ref 4.5–8)
PLATELET # BLD AUTO: 234 THOU/CMM (ref 140–350)
PMV BLD REES-ECKER: 7.2 FL (ref 7.5–11.3)
POTASSIUM SERPL-SCNC: 4 MMOL/L (ref 3.5–5.2)
PROT 24H UR-MRATE: NEGATIVE MG/DL
PROT SERPL-MCNC: 7 G/DL (ref 6.3–8.3)
RBC # BLD AUTO: 4.9 MILL/CMM (ref 4–5.4)
SL AMB POCT URINE COMMENT: NORMAL
SODIUM SERPL-SCNC: 138 MMOL/L (ref 135–145)
SP GR UR: 1 (ref 1–1.03)
TSH SERPL-ACNC: 0.82 UIU/ML (ref 0.45–5.33)
WBC # BLD AUTO: 9.3 THOU/CMM (ref 4–10.5)

## 2025-06-06 ENCOUNTER — OFFICE VISIT (OUTPATIENT)
Dept: FAMILY MEDICINE CLINIC | Facility: CLINIC | Age: 45
End: 2025-06-06
Payer: COMMERCIAL

## 2025-06-06 VITALS
HEIGHT: 72 IN | OXYGEN SATURATION: 97 % | WEIGHT: 201 LBS | BODY MASS INDEX: 27.22 KG/M2 | DIASTOLIC BLOOD PRESSURE: 76 MMHG | SYSTOLIC BLOOD PRESSURE: 116 MMHG | HEART RATE: 68 BPM | TEMPERATURE: 98.5 F

## 2025-06-06 DIAGNOSIS — Z28.39 IMMUNIZATION DEFICIENCY: ICD-10-CM

## 2025-06-06 DIAGNOSIS — Z00.00 ANNUAL PHYSICAL EXAM: Primary | ICD-10-CM

## 2025-06-06 DIAGNOSIS — Z23 IMMUNIZATION DUE: ICD-10-CM

## 2025-06-06 PROCEDURE — 99396 PREV VISIT EST AGE 40-64: CPT | Performed by: NURSE PRACTITIONER

## 2025-06-08 NOTE — PATIENT INSTRUCTIONS
"Patient Education     Routine physical for adults   The Basics   Written by the doctors and editors at [facility]   What is a physical? -- A physical is a routine visit, or \"check-up,\" with your doctor. You might also hear it called a \"wellness visit\" or \"preventive visit.\"  During each visit, the doctor will:   Ask about your physical and mental health   Ask about your habits, behaviors, and lifestyle   Do an exam   Give you vaccines if needed   Talk to you about any medicines you take   Give advice about your health   Answer your questions  Getting regular check-ups is an important part of taking care of your health. It can help your doctor find and treat any problems you have. But it's also important for preventing health problems.  A routine physical is different from a \"sick visit.\" A sick visit is when you see a doctor because of a health concern or problem. Since physicals are scheduled ahead of time, you can think about what you want to ask the doctor.  How often should I get a physical? -- It depends on your age and health. In general, for people age 21 years and older:   If you are younger than 50 years, you might be able to get a physical every 3 years.   If you are 50 years or older, your doctor might recommend a physical every year.  If you have an ongoing health condition, like diabetes or high blood pressure, your doctor will probably want to see you more often.  What happens during a physical? -- In general, each visit will include:   Physical exam - The doctor or nurse will check your height, weight, heart rate, and blood pressure. They will also look at your eyes and ears. They will ask about how you are feeling and whether you have any symptoms that bother you.   Medicines - It's a good idea to bring a list of all the medicines you take to each doctor visit. Your doctor will talk to you about your medicines and answer any questions. Tell them if you are having any side effects that bother you. You " "should also tell them if you are having trouble paying for any of your medicines.   Habits and behaviors - This includes:   Your diet   Your exercise habits   Whether you smoke, drink alcohol, or use drugs   Whether you are sexually active   Whether you feel safe at home  Your doctor will talk to you about things you can do to improve your health and lower your risk of health problems. They will also offer help and support. For example, if you want to quit smoking, they can give you advice and might prescribe medicines. If you want to improve your diet or get more physical activity, they can help you with this, too.   Lab tests, if needed - The tests you get will depend on your age and situation. For example, your doctor might want to check your:   Cholesterol   Blood sugar   Iron level   Vaccines - The recommended vaccines will depend on your age, health, and what vaccines you already had. Vaccines are very important because they can prevent certain serious or deadly infections.   Discussion of screening - \"Screening\" means checking for diseases or other health problems before they cause symptoms. Your doctor can recommend screening based on your age, risk, and preferences. This might include tests to check for:   Cancer, such as breast, prostate, cervical, ovarian, colorectal, prostate, lung, or skin cancer   Sexually transmitted infections, such as chlamydia and gonorrhea   Mental health conditions like depression and anxiety  Your doctor will talk to you about the different types of screening tests. They can help you decide which screenings to have. They can also explain what the results might mean.   Answering questions - The physical is a good time to ask the doctor or nurse questions about your health. If needed, they can refer you to other doctors or specialists, too.  Adults older than 65 years often need other care, too. As you get older, your doctor will talk to you about:   How to prevent falling at " home   Hearing or vision tests   Memory testing   How to take your medicines safely   Making sure that you have the help and support you need at home  All topics are updated as new evidence becomes available and our peer review process is complete.  This topic retrieved from [employer] on: May 02, 2024.  Topic 174114 Version 1.0  Release: 32.4.3 - C32.122  © 2024 UpToDate, Inc. and/or its affiliates. All rights reserved.  Consumer Information Use and Disclaimer   Disclaimer: This generalized information is a limited summary of diagnosis, treatment, and/or medication information. It is not meant to be comprehensive and should be used as a tool to help the user understand and/or assess potential diagnostic and treatment options. It does NOT include all information about conditions, treatments, medications, side effects, or risks that may apply to a specific patient. It is not intended to be medical advice or a substitute for the medical advice, diagnosis, or treatment of a health care provider based on the health care provider's examination and assessment of a patient's specific and unique circumstances. Patients must speak with a health care provider for complete information about their health, medical questions, and treatment options, including any risks or benefits regarding use of medications. This information does not endorse any treatments or medications as safe, effective, or approved for treating a specific patient. UpToDate, Inc. and its affiliates disclaim any warranty or liability relating to this information or the use thereof.The use of this information is governed by the Terms of Use, available at https://www.woltersBruxieuwer.com/en/know/clinical-effectiveness-terms. 2024© UpToDate, Inc. and its affiliates and/or licensors. All rights reserved.  Copyright   © 2024 UpToDate, Inc. and/or its affiliates. All rights reserved.

## 2025-06-08 NOTE — PROGRESS NOTES
Adult Annual Physical  Name: Maciej Feliz      : 1980      MRN: [id number]  Encounter Provider: AZUL Rios  Encounter Date: 2025   Encounter department: [facility]    :  Assessment & Plan  Annual physical exam  Presents of annual physical   Discussed healthy diet adequate hydration and exercise   Discussed  immunity and vaccines   Discussed preventative medicine- age specific   Questions answered         Immunization due  FACT SHEET PROVIDED        Immunization deficiency  FACT SHEET PROVIDED   Labs ordered   Needs to bring proof or titers   Orders:    Measles/Mumps/Rubella Immunity    Varicella zoster antibody, IgG    Varicella zoster antibody, IgM    Hepatitis B surface antigen    Hepatitis B surface antibody    Hepatitis A antibody, total          Preventive Screenings:  - Diabetes Screening: screening up-to-date  - Hepatitis C screening: screening up-to-date   - HIV screening: screening up-to-date   - Colon cancer screening: screening up-to-date   - Lung cancer screening: screening not indicated     Counseling/Anticipatory Guidance:  - Alcohol: discussed moderation in alcohol intake and recommendations for healthy alcohol use.   - Dental health: discussed importance of regular tooth brushing, flossing, and dental visits.   - Diet: discussed recommendations for a healthy/well-balanced diet.   - Exercise: the importance of regular exercise/physical activity was discussed. Recommend exercise 3-5 times per week for at least 30 minutes.   - Injury prevention: discussed safety/seat belts, safety helmets, smoke detectors, carbon monoxide detectors, and smoking near bedding or upholstery.       Depression Screening and Follow-up Plan: Patient was screened for depression during today's encounter. They screened negative with a PHQ-2 score of 0.          History of Present Illness     Adult Annual Physical:  Patient presents for annual physical. Pt is a 45 yr old  male   Presents in office for follow up annual physical and needs form filled .     Diet and Physical Activity:  - Diet/Nutrition: well balanced diet.  - Exercise: vigorous cardiovascular exercise and 5-7 times a week on average.    Depression Screening:  - PHQ-2 Score: 0    General Health:  - Sleep: 4-6 hours of sleep on average.  - Hearing: normal hearing right ear, normal hearing left ear and normal hearing bilateral ears.  - Vision: most recent eye exam > 1 year ago and wears glasses.  - Dental: regular dental visits and brushes teeth once daily.     Health:  - History of STDs: no.   - Urinary symptoms: none.     Advanced Care Planning:  - Has an advanced directive?: no    - Has a durable medical POA?: no      Review of Systems   Constitutional:  Negative for chills, fatigue, fever and unexpected weight change.   HENT:  Negative for congestion, dental problem, sinus pressure, sore throat and voice change.    Eyes: Negative.    Respiratory:  Negative for cough and shortness of breath.    Cardiovascular:  Negative for chest pain and palpitations.   Gastrointestinal:  Negative for abdominal distention, abdominal pain, nausea and vomiting.   Endocrine: Negative.         History of elevated fasting glucose    Genitourinary:  Negative for difficulty urinating and flank pain.        History of microscopic blood in urine    Musculoskeletal:  Negative for arthralgias and joint swelling.   Skin:  Negative for rash.   Allergic/Immunologic: Negative for environmental allergies.   Neurological:  Negative for dizziness and headaches.   Hematological:  Negative for adenopathy.   Psychiatric/Behavioral:  Negative for sleep disturbance and suicidal ideas. The patient is not nervous/anxious.      Pertinent Medical History   Reviewed        Medical History Reviewed by provider this encounter:     .  Past Medical History   Past Medical History[1]  Past Surgical History[2]  Family History[3]   reports that he quit smoking about 17  years ago. His smoking use included cigarettes. He started smoking about 26 years ago. He has a 3.8 pack-year smoking history. He has never used smokeless tobacco. He reports that he does not currently use alcohol after a past usage of about 3.0 standard drinks of alcohol per week. He reports that he does not use drugs.  No current outpatient medicationsAllergies[4]   Medications Ordered Prior to Encounter[5]   Social History[6]    Objective   /76 (BP Location: Right arm, Patient Position: Sitting, Cuff Size: Large)   Pulse 68   Temp 98.5 °F (36.9 °C)   Ht 6' (1.829 m)   Wt 91.2 kg (201 lb)   SpO2 97%   BMI 27.26 kg/m²     Physical Exam  Vitals and nursing note reviewed.   Constitutional:       Appearance: Normal appearance.      Comments: BMI 27.26   HENT:      Head: Atraumatic.     Eyes:      Extraocular Movements: Extraocular movements intact.       Cardiovascular:      Rate and Rhythm: Normal rate and regular rhythm.      Pulses: Normal pulses.      Heart sounds: Normal heart sounds.   Pulmonary:      Effort: Pulmonary effort is normal.      Breath sounds: Normal breath sounds.   Abdominal:      Palpations: Abdomen is soft.     Musculoskeletal:      Cervical back: Normal range of motion.      Right lower leg: No edema.      Left lower leg: No edema.     Skin:     General: Skin is warm.      Capillary Refill: Capillary refill takes less than 2 seconds.     Neurological:      Mental Status: He is alert and oriented to person, place, and time.     Psychiatric:         Mood and Affect: Mood normal.         Behavior: Behavior normal.     LABS REVIEWED   FORM FILLED OUT  Administrative Statements   I have spent a total time of 25  minutes in caring for this patient on the day of the visit/encounter including Diagnostic results, Prognosis, Risks and benefits of tx options, Instructions for management, Patient and family education, Importance of tx compliance, Risk factor reductions, Impressions, Counseling  / Coordination of care, Documenting in the medical record, Reviewing/placing orders in the medical record (including tests, medications, and/or procedures), and Obtaining or reviewing history  .       [1] No past medical history on file.  [2] No past surgical history on file.  [3] No family history on file.  [4] No Known Allergies  [5]   No current outpatient medications on file prior to visit.     No current facility-administered medications on file prior to visit.   [6]   Social History  Tobacco Use    Smoking status: Former     Current packs/day: 0.00     Average packs/day: 0.3 packs/day for 15.0 years (3.8 ttl pk-yrs)     Types: Cigarettes     Start date:      Quit date: 2008     Years since quittin.4    Smokeless tobacco: Never   Vaping Use    Vaping status: Never Used   Substance and Sexual Activity    Alcohol use: Not Currently     Alcohol/week: 3.0 standard drinks of alcohol     Types: 2 Cans of beer, 1 Shots of liquor per week    Drug use: Never    Sexual activity: Yes     Partners: Female     Birth control/protection: Male Sterilization

## 2025-06-18 ENCOUNTER — OFFICE VISIT (OUTPATIENT)
Age: 45
End: 2025-06-18
Payer: COMMERCIAL

## 2025-06-18 DIAGNOSIS — T15.01XA FOREIGN BODY OF RIGHT CORNEA, INITIAL ENCOUNTER: Primary | ICD-10-CM

## 2025-06-18 PROCEDURE — 99203 OFFICE O/P NEW LOW 30 MIN: CPT | Performed by: OPHTHALMOLOGY

## 2025-06-18 PROCEDURE — 92133 CPTRZD OPH DX IMG PST SGM ON: CPT | Performed by: OPHTHALMOLOGY

## 2025-06-18 RX ORDER — ERYTHROMYCIN 5 MG/G
OINTMENT OPHTHALMIC
Qty: 3.5 G | Refills: 0 | Status: SHIPPED | OUTPATIENT
Start: 2025-06-18

## 2025-06-18 RX ORDER — OFLOXACIN 3 MG/ML
1 SOLUTION/ DROPS OPHTHALMIC 4 TIMES DAILY
Qty: 5 ML | Refills: 0 | Status: SHIPPED | OUTPATIENT
Start: 2025-06-18

## 2025-06-18 NOTE — PROGRESS NOTES
Name: Maciej Feliz      : 1980      MRN: [id number]  Encounter Provider: Matthew Morse MD  Encounter Date: 2025   Encounter department: [facility] OPHTHALMOLOGY  :  Assessment & Plan  Foreign body of right cornea, initial encounter                 Maciej Feliz is a 45 y.o. male who presents for metal in right eye.  Patient states on Monday he was grinding metal. Patient states he didn't feel anything go in the eye. Yesterday he looked in mirror and saw metal in eye.    Patient states he has been taking Tylenol. Patient states he is light sensitive with outside lighting.    Patient has been using redness relief drops.    Patient denies vision changes.  History obtained from: patient    Review of Systems  Medical History Reviewed by provider this encounter:     .     Past Ocular History:  Ocular Meds/Drops:     Base Eye Exam       Visual Acuity (Snellen - Linear)         Right Left    Dist sc 20/25-2 20/20              Pupils         Pupils APD    Right PERRL None    Left PERRL None              Visual Fields         Left Right     Full Full              Extraocular Movement         Right Left     Full Full              Neuro/Psych       Oriented x3: Yes    Mood/Affect: Normal                  Slit Lamp and Fundus Exam       External Exam         Right Left    External Normal Normal              Slit Lamp Exam         Right Left    Lids/Lashes Normal Normal    Conjunctiva/Sclera injected White and quiet    Cornea Clear.  Small superficial foreign body just superior to center at about 12 Clear    Anterior Chamber Deep and quiet Deep and quiet    Iris Round and reactive Round and reactive    Lens Clear Clear    Anterior Vitreous not dilated, looks clear not dilated, looks clear              Fundus Exam         Right Left    Disc not dilated, disc margin sharp, good color not dilated, disc margin sharp, good color    Macula not dilated, flat not dilated, flat                       IMAGING:none    IMP:  Encounter Diagnosis   Name Primary?    Foreign body of right cornea, initial encounter Yes     H&P, ROS, Meds, pertinent hx reviewed.  Pertinent findings dw pt and questions answered    45M with corneal foreign mandi, no evidence of infection OD.    REC:  Removal of corneal FB performed without complication.  Small epi defect at site,  E'mycon and moxifloxacin placed  FU tomorrow  RX for ocuflox qid and emycin ophth hs/prn OD

## 2025-06-19 ENCOUNTER — OFFICE VISIT (OUTPATIENT)
Age: 45
End: 2025-06-19

## 2025-06-19 DIAGNOSIS — T15.01XA FOREIGN BODY OF RIGHT CORNEA, INITIAL ENCOUNTER: Primary | ICD-10-CM

## 2025-06-19 NOTE — PATIENT INSTRUCTIONS
Please make an appt with your local eye doctor within a week for follow-up, but get checked immediately if any worsening/concerns/loss of vision/increased pain, etc    Use the drops 4 times daily for a total of one week then stop (if runs out sooner that is OK)  Use the ointment at night as needed for the same period.

## 2025-06-19 NOTE — PROGRESS NOTES
Name: Maciej Feliz      : 1980      MRN: [id number]  Encounter Provider: Matthew Morse MD  Encounter Date: 2025   Encounter department: [facility] OPHTHALMOLOGY  :  Assessment & Plan  Foreign body of right cornea, initial encounter           Maciej Feliz is a 45 y.o. male who presents for 1 day recheck corneal FB OD - removed yd.    Reports vision a little blurry.  No pain.  Eye still red and sensitive to sunlight.  Overall, though, feels much better than yesterday     History obtained from: patient    Review of Systems  Medical History Reviewed by provider this encounter:     .     Past Ocular History: corneal FB s/p removal    Ocular Meds/Drops:   Ofloxacin QID OS  Emycin usman QHS OD    Base Eye Exam       Visual Acuity (Snellen - Linear)         Right Left    Dist sc 20/25-2 20/20              Tonometry (Tonopen, 9:13 AM)         Right Left    Pressure 21 20              Neuro/Psych       Oriented x3: Yes    Mood/Affect: Normal                  Slit Lamp and Fundus Exam       External Exam         Right Left    External Normal Normal              Slit Lamp Exam         Right Left    Lids/Lashes Normal Normal    Conjunctiva/Sclera injected White and quiet    Cornea Clear.  site of FB healing and epi defect closed, no infiltrate, kirill neg Clear    Anterior Chamber Deep and quiet, no c/F Deep and quiet    Iris Round and reactive Round and reactive    Lens Clear Clear    Anterior Vitreous not dilated, looks clear not dilated, looks clear                      IMAGING:none      IMP:  Encounter Diagnosis   Name Primary?    Foreign body of right cornea, initial encounter Yes     H&P, ROS, Meds, pertinent hx reviewed.  Pertinent findings dw pt and questions answered      Doing well s/p removal superficial FB OD, no evidence of infection, epithelium ess healed.    REC:  FU locally in one week (pt will set up, [facility]), sooner if any worsening, concerns, evcidence infection, pain,  lov  Cont ocuflox QID and E'mycin ophth oint hs/prn for one week

## 2025-06-27 ENCOUNTER — APPOINTMENT (EMERGENCY)
Dept: RADIOLOGY | Facility: HOSPITAL | Age: 45
End: 2025-06-27
Payer: COMMERCIAL

## 2025-06-27 ENCOUNTER — HOSPITAL ENCOUNTER (EMERGENCY)
Facility: HOSPITAL | Age: 45
Discharge: HOME | End: 2025-06-27
Attending: EMERGENCY MEDICINE
Payer: COMMERCIAL

## 2025-06-27 VITALS
RESPIRATION RATE: 18 BRPM | HEART RATE: 61 BPM | TEMPERATURE: 98.5 F | SYSTOLIC BLOOD PRESSURE: 152 MMHG | DIASTOLIC BLOOD PRESSURE: 73 MMHG | OXYGEN SATURATION: 98 %

## 2025-06-27 DIAGNOSIS — S83.412A SPRAIN OF MEDIAL COLLATERAL LIGAMENT OF LEFT KNEE, INITIAL ENCOUNTER: Primary | ICD-10-CM

## 2025-06-27 DIAGNOSIS — M25.569 KNEE PAIN: ICD-10-CM

## 2025-06-27 PROCEDURE — 99284 EMERGENCY DEPT VISIT MOD MDM: CPT | Performed by: EMERGENCY MEDICINE

## 2025-06-27 PROCEDURE — 99283 EMERGENCY DEPT VISIT LOW MDM: CPT

## 2025-06-27 PROCEDURE — 73564 X-RAY EXAM KNEE 4 OR MORE: CPT

## 2025-06-28 VITALS — HEIGHT: 72 IN | BODY MASS INDEX: 26.82 KG/M2 | WEIGHT: 198 LBS

## 2025-06-28 DIAGNOSIS — S83.412A SPRAIN OF MEDIAL COLLATERAL LIGAMENT OF LEFT KNEE, INITIAL ENCOUNTER: Primary | ICD-10-CM

## 2025-06-28 DIAGNOSIS — M25.569 KNEE PAIN: ICD-10-CM

## 2025-06-28 PROCEDURE — 99214 OFFICE O/P EST MOD 30 MIN: CPT | Performed by: FAMILY MEDICINE

## 2025-06-28 RX ORDER — NAPROXEN 500 MG/1
500 TABLET ORAL 2 TIMES DAILY WITH MEALS
Qty: 60 TABLET | Refills: 0 | Status: SHIPPED | OUTPATIENT
Start: 2025-06-28
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
## 2025-06-28 NOTE — DISCHARGE INSTRUCTIONS
Your x-ray is negative for acute dislocation, subluxation, or fracture.  A referral has been placed to orthopedic surgery, recommend that you schedule an appointment for further workup and evaluation for ligamentous injury.  Continue to take Tylenol and ibuprofen as needed for pain.
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
## 2025-06-28 NOTE — ASSESSMENT & PLAN NOTE
> 45 min devoted to review of previous, pertinent medical records, imaging, discussion of treatment options, counseling and documentation  Imaging independently reviewed and discussed with patient.   no acute fractures appreciated.  Follow-up official reading.  We discussed the nature of mcl sprain at length and detailed the treatment approach.  Directed to ice the area daily for 20 minutes at a time using a barrier to protect the skin- stressed specifically icing after activity to address inflammation  Start Naproxen 500 mg PO BID for 10 days with food, then to be used as needed moving forward. They were instructed to discontinue this medication is they experienced any upset stomach.  Continue in hinge knee brace  Follow up in 5 weeks. Should sx's worsen or any concerns arise, they were advised to follow up sooner or seek more immediate medical attention.  All of the patient's concerns were addressed and questions answered. They verbalized agreement with and understanding of the treatment plan.    '    Orders:    Ambulatory Referral to Orthopedic Surgery
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
## 2025-07-26 ENCOUNTER — OFFICE VISIT (OUTPATIENT)
Dept: OBGYN CLINIC | Facility: CLINIC | Age: 45
End: 2025-07-26
Payer: COMMERCIAL

## 2025-07-26 VITALS — WEIGHT: 197 LBS | HEIGHT: 72 IN | BODY MASS INDEX: 26.68 KG/M2

## 2025-07-26 DIAGNOSIS — S83.412D SPRAIN OF MEDIAL COLLATERAL LIGAMENT OF LEFT KNEE, SUBSEQUENT ENCOUNTER: Primary | ICD-10-CM

## 2025-07-26 DIAGNOSIS — S83.412A SPRAIN OF MEDIAL COLLATERAL LIGAMENT OF LEFT KNEE, INITIAL ENCOUNTER: ICD-10-CM

## 2025-07-26 PROCEDURE — 99213 OFFICE O/P EST LOW 20 MIN: CPT | Performed by: FAMILY MEDICINE
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
## 2025-07-26 NOTE — ASSESSMENT & PLAN NOTE
Remains symptomatic from MCL sprain with immobilization greater than 5 weeks.  Examination does reveal tenderness to palpation over the MCL and with valgus stress testing.  Given ongoing symptoms recommending an MRI study for further evaluation  To rule out alternative etiology  Orders:    MRI knee left  wo contrast; Future

## 2025-07-28 RX ORDER — NAPROXEN 500 MG/1
500 TABLET ORAL 2 TIMES DAILY WITH MEALS
Qty: 60 TABLET | Refills: 0 | Status: SHIPPED | OUTPATIENT
Start: 2025-07-28

## 2025-08-04 ENCOUNTER — HOSPITAL ENCOUNTER (OUTPATIENT)
Dept: RADIOLOGY | Facility: HOSPITAL | Age: 45
Discharge: HOME | End: 2025-08-04
Payer: COMMERCIAL

## 2025-08-04 ENCOUNTER — HOSPITAL ENCOUNTER (OUTPATIENT)
Dept: MRI IMAGING | Facility: HOSPITAL | Age: 45
Discharge: HOME | End: 2025-08-04
Payer: COMMERCIAL

## 2025-08-04 DIAGNOSIS — S83.412D SPRAIN OF MEDIAL COLLATERAL LIGAMENT OF LEFT KNEE, SUBSEQUENT ENCOUNTER: ICD-10-CM

## 2025-08-04 PROCEDURE — 73721 MRI JNT OF LWR EXTRE W/O DYE: CPT
